# Patient Record
Sex: MALE | Race: WHITE
[De-identification: names, ages, dates, MRNs, and addresses within clinical notes are randomized per-mention and may not be internally consistent; named-entity substitution may affect disease eponyms.]

---

## 2021-04-27 PROBLEM — Z00.00 ENCOUNTER FOR PREVENTIVE HEALTH EXAMINATION: Status: ACTIVE | Noted: 2021-04-27

## 2021-05-10 ENCOUNTER — NON-APPOINTMENT (OUTPATIENT)
Age: 81
End: 2021-05-10

## 2021-05-10 ENCOUNTER — APPOINTMENT (OUTPATIENT)
Dept: CARDIOLOGY | Facility: CLINIC | Age: 81
End: 2021-05-10
Payer: MEDICARE

## 2021-05-10 VITALS
BODY MASS INDEX: 29.8 KG/M2 | WEIGHT: 220 LBS | SYSTOLIC BLOOD PRESSURE: 138 MMHG | HEIGHT: 72 IN | DIASTOLIC BLOOD PRESSURE: 70 MMHG | OXYGEN SATURATION: 100 % | HEART RATE: 76 BPM

## 2021-05-10 DIAGNOSIS — Z78.9 OTHER SPECIFIED HEALTH STATUS: ICD-10-CM

## 2021-05-10 DIAGNOSIS — Z87.891 PERSONAL HISTORY OF NICOTINE DEPENDENCE: ICD-10-CM

## 2021-05-10 DIAGNOSIS — R47.81 SLURRED SPEECH: ICD-10-CM

## 2021-05-10 DIAGNOSIS — Z83.3 FAMILY HISTORY OF DIABETES MELLITUS: ICD-10-CM

## 2021-05-10 PROCEDURE — 99204 OFFICE O/P NEW MOD 45 MIN: CPT

## 2021-05-10 PROCEDURE — 93000 ELECTROCARDIOGRAM COMPLETE: CPT

## 2021-05-10 RX ORDER — ATORVASTATIN CALCIUM 20 MG/1
20 TABLET, FILM COATED ORAL
Qty: 30 | Refills: 0 | Status: ACTIVE | COMMUNITY

## 2021-06-03 NOTE — DISCUSSION/SUMMARY
[FreeTextEntry1] : AF- cont Eliquis.  No bleeding issues\par \par HTN- controlled\par \par HLD- stable\par \par \par \par Followup in 3 mths

## 2021-06-03 NOTE — HISTORY OF PRESENT ILLNESS
[FreeTextEntry1] : 80 yea giovanid male with HTN, AF on Eliquis and rate controlled, and HLD presenting to change cardiologist.  Pt denies any sx of CP, SOB or h/A

## 2021-08-09 ENCOUNTER — APPOINTMENT (OUTPATIENT)
Dept: CARDIOLOGY | Facility: CLINIC | Age: 81
End: 2021-08-09
Payer: MEDICARE

## 2021-08-09 ENCOUNTER — NON-APPOINTMENT (OUTPATIENT)
Age: 81
End: 2021-08-09

## 2021-08-09 VITALS
HEIGHT: 72 IN | HEART RATE: 72 BPM | SYSTOLIC BLOOD PRESSURE: 150 MMHG | OXYGEN SATURATION: 98 % | DIASTOLIC BLOOD PRESSURE: 88 MMHG | WEIGHT: 220 LBS | BODY MASS INDEX: 29.8 KG/M2

## 2021-08-09 PROCEDURE — 93000 ELECTROCARDIOGRAM COMPLETE: CPT

## 2021-08-09 PROCEDURE — 99214 OFFICE O/P EST MOD 30 MIN: CPT

## 2021-08-22 NOTE — HISTORY OF PRESENT ILLNESS
[FreeTextEntry1] : 80 year old male with HTN, AF on Eliquis and rate controlled, and HLD presenting to change cardiologist.  Pt denies any sx of CP, SOB or h/A

## 2021-08-22 NOTE — DISCUSSION/SUMMARY
[FreeTextEntry1] : AF- cont Eliquis.  No bleeding issues\par \par HTN- controlled\par \par HLD- stable\par \par Followup in 3 mths\par \par Time spent reviewing history, med rec, exam, pt discussion and note writing

## 2021-10-08 ENCOUNTER — APPOINTMENT (OUTPATIENT)
Dept: NEUROLOGY | Facility: CLINIC | Age: 81
End: 2021-10-08

## 2021-11-12 ENCOUNTER — APPOINTMENT (OUTPATIENT)
Dept: CARDIOLOGY | Facility: CLINIC | Age: 81
End: 2021-11-12
Payer: MEDICARE

## 2021-11-12 ENCOUNTER — NON-APPOINTMENT (OUTPATIENT)
Age: 81
End: 2021-11-12

## 2021-11-12 VITALS — DIASTOLIC BLOOD PRESSURE: 93 MMHG | SYSTOLIC BLOOD PRESSURE: 145 MMHG | OXYGEN SATURATION: 98 % | HEART RATE: 83 BPM

## 2021-11-12 PROCEDURE — 93000 ELECTROCARDIOGRAM COMPLETE: CPT

## 2021-11-12 PROCEDURE — 99214 OFFICE O/P EST MOD 30 MIN: CPT

## 2021-12-02 NOTE — HISTORY OF PRESENT ILLNESS
[FreeTextEntry1] : 81 year old male with HTN, AF on Eliquis and rate controlled, and HLD presenting to change cardiologist.  Pt denies any sx of CP, SOB or h/A

## 2022-01-21 ENCOUNTER — APPOINTMENT (OUTPATIENT)
Dept: CARDIOLOGY | Facility: CLINIC | Age: 82
End: 2022-01-21
Payer: MEDICARE

## 2022-01-21 VITALS
HEART RATE: 75 BPM | BODY MASS INDEX: 30.61 KG/M2 | DIASTOLIC BLOOD PRESSURE: 88 MMHG | HEIGHT: 72 IN | OXYGEN SATURATION: 100 % | WEIGHT: 226 LBS | SYSTOLIC BLOOD PRESSURE: 149 MMHG

## 2022-01-21 PROCEDURE — 99214 OFFICE O/P EST MOD 30 MIN: CPT

## 2022-01-21 PROCEDURE — 93000 ELECTROCARDIOGRAM COMPLETE: CPT

## 2022-01-29 NOTE — DISCUSSION/SUMMARY
[FreeTextEntry1] : AF- cont Eliquis.  No bleeding issues\par \par HTN- controlled\par \par HLD- stable\par \par Followup in 6 mths\par \par Time spent reviewing history, med rec, exam, pt discussion and note writing

## 2022-03-21 ENCOUNTER — APPOINTMENT (OUTPATIENT)
Dept: NEUROLOGY | Facility: CLINIC | Age: 82
End: 2022-03-21
Payer: MEDICARE

## 2022-03-21 VITALS
WEIGHT: 230 LBS | BODY MASS INDEX: 31.15 KG/M2 | DIASTOLIC BLOOD PRESSURE: 88 MMHG | HEART RATE: 77 BPM | HEIGHT: 72 IN | SYSTOLIC BLOOD PRESSURE: 128 MMHG

## 2022-03-21 DIAGNOSIS — R27.0 ATAXIA, UNSPECIFIED: ICD-10-CM

## 2022-03-21 DIAGNOSIS — R44.1 VISUAL HALLUCINATIONS: ICD-10-CM

## 2022-03-21 PROCEDURE — 99204 OFFICE O/P NEW MOD 45 MIN: CPT

## 2022-03-21 NOTE — ASSESSMENT
[FreeTextEntry1] : Mr. Moses is an 81-year-old with subacute progressive motor, balance and cognitive difficulties.  Although possibly on the basis of cerebrovascular disease, I suspect a neurodegenerative disorder. 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  I cannot exclude dementia with Lewy bodies.\par \par I suggested that he proceed with an FDG PET scan of the brain.  Further management will depend upon that result and his clinical course.

## 2022-03-21 NOTE — REVIEW OF SYSTEMS
[Memory Lapses or Loss] : memory loss [Decr. Concentrating Ability] : decreased concentrating ability [Difficulty Walking] : difficulty walking [Negative] : Heme/Lymph

## 2022-03-21 NOTE — PHYSICAL EXAM
[FreeTextEntry1] : Constitutional:  Patient was well-developed, well-nourished and in no acute distress. \par \par Head:  Normocephalic, atraumatic. Tympanic membranes were clear. \par \par Neck:  Supple with full range of motion. \par \par Cardiovascular:  Cardiac rhythm was irregular without murmur. There were no carotid bruits. Peripheral pulses were full and symmetric. \par \par Respiratory:  Lungs were clear. \par \par Abdomen:  Soft and nontender. \par \par Spine:  Nontender. \par \par Skin:  There were no rashes. \par \par NEUROLOGICAL EXAMINATION:\par \par Mental Status: Patient was alert and oriented. Speech was fluent. There was no dysarthria.  He scored 27 out of 30 on MMSE making 3 errors in recall.  Handwriting was not micrographic.  He was hallucinating.\par \par Cranial Nerves: \par \par II: He could finger count bilaterally.  Pupils were surgical but reactive. Visual fields were full. Funduscopic examination was normal. \par \par III, IV, VI:  Eye movements were full but saccadic without nystagmus. \par \par V: Facial sensation was intact. \par \par VII: Facial strength was normal.  There was minimal if any hypomimia.\par \par VIII: Hearing was diminished bilaterally. \par \par IX, X: Palatal movement was normal. Phonation was normal. \par \par XI: Sternocleidomastoids and trapezii were normal. \par \par XII: Tongue was midline and movements normal. There was no lingual atrophy or fasciculations. \par \par Motor Examination: Muscle bulk, tone and strength were normal except for limited shoulder abduction, left worse than right..  There was no tremor or rigidity.\par \par Sensory Examination: Pinprick, vibration and joint position sense were intact. \par \par Reflexes: DTRs were 2 at the biceps and knees and absent elsewhere.\par \par Plantar Responses: Plantar responses were flexor. \par \par Coordination/Cerebellar Function: There was no dysmetria on finger to nose testing.  There was mild heel-to-shin dysmetria.\par \par Gait/Stance: Posture was mildly stooped.  He had a wide-based unsteady gait.  He was very unsteady on Romberg testing and could not tandem.\par

## 2022-03-21 NOTE — CONSULT LETTER
[Dear  ___] : Dear  [unfilled], [Consult Letter:] : I had the pleasure of evaluating your patient, [unfilled]. [Please see my note below.] : Please see my note below. [Consult Closing:] : Thank you very much for allowing me to participate in the care of this patient.  If you have any questions, please do not hesitate to contact me. [Sincerely,] : Sincerely, [DrChandan  ___] : Dr. LAO [FreeTextEntry3] : Abhilash Bernard MD\par

## 2022-04-06 ENCOUNTER — RESULT REVIEW (OUTPATIENT)
Age: 82
End: 2022-04-06

## 2022-04-07 ENCOUNTER — APPOINTMENT (OUTPATIENT)
Dept: NUCLEAR MEDICINE | Facility: IMAGING CENTER | Age: 82
End: 2022-04-07

## 2022-04-07 ENCOUNTER — OUTPATIENT (OUTPATIENT)
Dept: OUTPATIENT SERVICES | Facility: HOSPITAL | Age: 82
LOS: 1 days | End: 2022-04-07
Payer: MEDICARE

## 2022-04-07 DIAGNOSIS — G31.84 MILD COGNITIVE IMPAIRMENT OF UNCERTAIN OR UNKNOWN ETIOLOGY: ICD-10-CM

## 2022-04-07 PROCEDURE — XXXXX: CPT

## 2022-04-07 PROCEDURE — 78608 BRAIN IMAGING (PET): CPT | Mod: 26,ME

## 2022-04-07 PROCEDURE — G1004: CPT

## 2022-04-08 PROCEDURE — G1004: CPT

## 2022-04-08 PROCEDURE — 78999 UNLISTED MISC PX DX NUC MED: CPT

## 2022-04-08 PROCEDURE — 78608 BRAIN IMAGING (PET): CPT | Mod: ME

## 2022-04-08 PROCEDURE — A9552: CPT

## 2022-04-11 ENCOUNTER — NON-APPOINTMENT (OUTPATIENT)
Age: 82
End: 2022-04-11

## 2022-04-14 ENCOUNTER — TRANSCRIPTION ENCOUNTER (OUTPATIENT)
Age: 82
End: 2022-04-14

## 2022-04-18 ENCOUNTER — TRANSCRIPTION ENCOUNTER (OUTPATIENT)
Age: 82
End: 2022-04-18

## 2022-06-13 ENCOUNTER — TRANSCRIPTION ENCOUNTER (OUTPATIENT)
Age: 82
End: 2022-06-13

## 2022-06-16 ENCOUNTER — NON-APPOINTMENT (OUTPATIENT)
Age: 82
End: 2022-06-16

## 2022-06-16 ENCOUNTER — TRANSCRIPTION ENCOUNTER (OUTPATIENT)
Age: 82
End: 2022-06-16

## 2022-07-05 ENCOUNTER — RX RENEWAL (OUTPATIENT)
Age: 82
End: 2022-07-05

## 2022-07-18 ENCOUNTER — TRANSCRIPTION ENCOUNTER (OUTPATIENT)
Age: 82
End: 2022-07-18

## 2022-07-22 ENCOUNTER — APPOINTMENT (OUTPATIENT)
Dept: CARDIOLOGY | Facility: CLINIC | Age: 82
End: 2022-07-22

## 2022-07-28 ENCOUNTER — APPOINTMENT (OUTPATIENT)
Dept: NEUROLOGY | Facility: CLINIC | Age: 82
End: 2022-07-28

## 2022-08-12 ENCOUNTER — APPOINTMENT (OUTPATIENT)
Dept: CARDIOLOGY | Facility: CLINIC | Age: 82
End: 2022-08-12

## 2022-08-22 ENCOUNTER — APPOINTMENT (OUTPATIENT)
Dept: NEUROLOGY | Facility: CLINIC | Age: 82
End: 2022-08-22

## 2022-08-22 VITALS
SYSTOLIC BLOOD PRESSURE: 124 MMHG | WEIGHT: 220 LBS | HEART RATE: 80 BPM | BODY MASS INDEX: 29.8 KG/M2 | HEIGHT: 72 IN | DIASTOLIC BLOOD PRESSURE: 79 MMHG

## 2022-08-22 PROCEDURE — 99214 OFFICE O/P EST MOD 30 MIN: CPT

## 2022-08-22 NOTE — ASSESSMENT
[FreeTextEntry1] : Mr. Moses is an 81-year-old with subacute progressive motor, balance and cognitive difficulties.  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  His response to low-dose levodopa has been minimal if any.  He is experiencing side effects and Sinemet is being tapered.  He is scheduled to see Dr. Ramirez for a confirmatory second opinion in October.  I will provide Mr. Moses with a prescription for physical therapy.  I also completed an application for a handicap parking permit.  Further management will depend upon his clinical course.  He will be reevaluated in 6 months.  I suggested that he take all precautions to avoid falling.

## 2022-08-22 NOTE — HISTORY OF PRESENT ILLNESS
[FreeTextEntry1] : Mr. ROMAN WILSON returned to the office having been initially evaluated on March 21, 2020.  He is a 81 year right-handed patient who began experiencing difficulties with gait and balance in 2019.  He would occasionally experience slurred speech.  These episodes were fleeting.  In January 2021, he was hospitalized at [facility].  MRI of the brain revealed foci of T2 and flair hyperintensity in the subcortical and periventricular cerebral white matter suggesting mild chronic ischemic small vessel disease.  There were remote lacunar infarcts in the gangliocapsular regions and right frontal corona radiata.  There was a tiny focus of susceptibility in the right parietal region suggesting chronic microhemorrhage.\par \par He was subsequently evaluated by Dr. Katherine Ann.  MRI of the cervical spine did not reveal evidence of myelopathy.  Serologies were unremarkable.  A neuropsychological evaluation revealed mild cognitive impairment.\par \par According to Mrs. Wilson, he had short-term memory loss or repetitiveness for a year.  His speech was occasionally thick.  He was hard of hearing.  He complained of distortion of images.  He described the chairs in the dining room leaning backwards.  He had urinary frequency.  He was undergoing physical therapy.\par \par His subacute progressive motor, balance and cognitive difficulties with possibly on the basis of cerebrovascular disease but I suspected a neurodegenerative disorder.  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  This did not significant improve his gait but caused severe diarrhea.  Sinemet is being tapered.\par \par According to Mrs. Wilson, his imbalance is worsening.  He feels that he is dragging his left leg and drools.\par \par Past surgical history is notable for cataract extractions, bilateral knee replacements and radiation for cancer of the prostate.  Medical problems include hypertension hyperlipidemia, atrial fibrillation, irritable bowel syndrome and prostate cancer.  He has no allergies.  Medications include apixaban 5 mg twice a day, metoprolol 50 mg twice a day, amlodipine 10 mg daily and atorvastatin 20 mg daily.  He is taking Sinemet 25/100 1 tablet twice a day.\par \par He is  and is a retired publishing executive.  He is a former smoker and drinks socially.  Family history is notable for a brother who suffers from Alzheimer's disease.  His mother suffered from diabetes and his father from gastrointestinal problems.\par \par

## 2022-09-02 ENCOUNTER — NON-APPOINTMENT (OUTPATIENT)
Age: 82
End: 2022-09-02

## 2022-09-02 ENCOUNTER — APPOINTMENT (OUTPATIENT)
Dept: CARDIOLOGY | Facility: CLINIC | Age: 82
End: 2022-09-02

## 2022-09-02 VITALS
OXYGEN SATURATION: 98 % | WEIGHT: 220 LBS | BODY MASS INDEX: 29.8 KG/M2 | HEART RATE: 70 BPM | HEIGHT: 72 IN | DIASTOLIC BLOOD PRESSURE: 84 MMHG | SYSTOLIC BLOOD PRESSURE: 131 MMHG

## 2022-09-02 DIAGNOSIS — E78.5 HYPERLIPIDEMIA, UNSPECIFIED: ICD-10-CM

## 2022-09-02 DIAGNOSIS — I10 ESSENTIAL (PRIMARY) HYPERTENSION: ICD-10-CM

## 2022-09-02 PROCEDURE — 99214 OFFICE O/P EST MOD 30 MIN: CPT

## 2022-09-02 PROCEDURE — 93000 ELECTROCARDIOGRAM COMPLETE: CPT

## 2022-09-02 RX ORDER — CARBIDOPA AND LEVODOPA 25; 100 MG/1; MG/1
25-100 TABLET ORAL 3 TIMES DAILY
Qty: 540 | Refills: 3 | Status: DISCONTINUED | COMMUNITY
Start: 2022-04-13 | End: 2022-09-02

## 2022-10-03 PROBLEM — I10 HYPERTENSION: Status: ACTIVE | Noted: 2021-05-10

## 2022-10-03 PROBLEM — E78.5 HYPERLIPIDEMIA: Status: ACTIVE | Noted: 2021-05-10

## 2022-10-03 NOTE — HISTORY OF PRESENT ILLNESS
[FreeTextEntry1] : 82 year old male with HTN, AF on Eliquis and rate controlled, and HLD presenting to change cardiologist.  Pt denies any sx of CP, SOB or h/A

## 2022-10-03 NOTE — DISCUSSION/SUMMARY
[FreeTextEntry1] : AF- cont Eliquis.  No bleeding issues\par \par HTN- controlled\par \par HLD- stable\par \par Followup in 6 mths\par \par Time spent reviewing history, med rec, exam, pt discussion and note writing [EKG obtained to assist in diagnosis and management of assessed problem(s)] : EKG obtained to assist in diagnosis and management of assessed problem(s)

## 2022-10-07 ENCOUNTER — APPOINTMENT (OUTPATIENT)
Dept: NEUROLOGY | Facility: CLINIC | Age: 82
End: 2022-10-07

## 2022-10-07 VITALS
DIASTOLIC BLOOD PRESSURE: 84 MMHG | HEIGHT: 72 IN | SYSTOLIC BLOOD PRESSURE: 138 MMHG | BODY MASS INDEX: 29.8 KG/M2 | WEIGHT: 220 LBS | HEART RATE: 62 BPM

## 2022-10-07 DIAGNOSIS — I48.91 UNSPECIFIED ATRIAL FIBRILLATION: ICD-10-CM

## 2022-10-07 DIAGNOSIS — G31.84 MILD COGNITIVE IMPAIRMENT, SO STATED: ICD-10-CM

## 2022-10-07 PROCEDURE — 99215 OFFICE O/P EST HI 40 MIN: CPT

## 2022-10-07 NOTE — DISCUSSION/SUMMARY
[FreeTextEntry1] : In summary, the patient is an 82-year-old right-handed man with difficulty walking and slurred speech since about 2019.  He also has some mild cognitive issues.  The examination was significant for square wave jerks, a wide-based gait, and may be minimal left-sided bradykinesia but no other movement abnormalities.\par \par Overall I am not certain at this point of his diagnosis.  While there may be some progression, it has not been significant.  While the FDG PET scan did suggest the possibility of progressive supranuclear palsy, outside of the square jerks he did not have any eye movement abnormalities, and he had no axial rigidity.  Reviewing the FDG PET images I am also not certain if he had hypometabolism of the basal ganglia, and I will check with the radiologist on that.  Levodopa did not help him, but again, he does not have any significant parkinsonism.  \par As a next step, I did recommend repeating the MRI of the brain since it has been a few years.  I also recommended an after by PET scan to see if there is any dopaminergic problem.  Even though the time course would be unusual I did also recommend a paraneoplastic panel be checked, as well as B12 and vitamin D levels.  He is already getting physical therapy, which I recommended he continue.\par \par I spent approximately 65 minutes with the patient and his wife, examining and discussing the above findings impression.  Follow-up will be in 5 to 6 months, sooner if new problems arise, and they will let me know when the imaging has been done.

## 2022-10-07 NOTE — HISTORY OF PRESENT ILLNESS
[FreeTextEntry1] : The patient is a 82-year-old right-handed man who comes referred for gait issues since 2019.  Around that time he developed slurred speech and started falling.  At times this was backwards other times his legs just seem to give in.  In January 2021 he was hospitalized in [place].  An MRI of the brain at that time showed mild chronic ischemic small vessel disease with remote lacunar infarcts in the gangliocapsular region and right frontal corona radiata.  There also was a tiny focus assessability in the right parietal region suggesting a chronic microhemorrhage.\par He was subsequently evaluated by Dr. Katherine Ann.  An MRI of the cervical spine did not show any myelopathy.  Serologies were unremarkable.  A neuropsychological radiation revealed mild cognitive impairment.\par \par Besides the slurred speech he has no changes in his voice.  There are no changes in his facial expression.  He has no drooling or dysphagia.  He has no trouble turning over in bed.  His handwriting has become less legible but not change in size.  He is independent in ADLs.  His walking is difficult though he has no freezing of gait.  He does fall.  There are no tremors.\par \par His memory is " not bad".  He has mild depression but no anxiety.  He has no hallucinations except he sometimes feels that the backs of the kitchen chairs appear to be moving.  He has decreased hearing.  He has no history of acting out his dreams.  He has no constipation or urinary issues except those caused by prostate cancer radiation treatment.  He has no orthostasis.  There is no family history of parkinsonian disorders.\par \par He was evaluated by my colleague Dr. Abhilash Bernard.  An FDG PET scan was read as potentially consistent with progressive supranuclear palsy.  He tried levodopa up to 2 pills 3 times a day which did not make any difference but caused diarrhea.\par \par He is  and is a retired publishing executive.  He is a former smoker and drinks socially.  Family history is notable for a brother who suffers from Alzheimer's disease.  His mother suffered from diabetes and his father from gastrointestinal problems.\par \par

## 2022-10-07 NOTE — PHYSICAL EXAM
[Person] : oriented to person [Place] : oriented to place [Time] : oriented to time [Registration Intact] : recent registration memory intact [Naming Objects] : no difficulty naming common objects [Repeating Phrases] : no difficulty repeating a phrase [Writing A Sentence] : no difficulty writing a sentence [Fluency] : fluency intact [Comprehension] : comprehension intact [Reading] : reading intact [Cranial Nerves Optic (II)] : visual acuity intact bilaterally,  visual fields full to confrontation, pupils equal round and reactive to light [Cranial Nerves Oculomotor (III)] : extraocular motion intact [Cranial Nerves Trigeminal (V)] : facial sensation intact symmetrically [Cranial Nerves Facial (VII)] : face symmetrical [Cranial Nerves Vestibulocochlear (VIII)] : hearing was intact bilaterally [Cranial Nerves Glossopharyngeal (IX)] : tongue and palate midline [Cranial Nerves Accessory (XI - Cranial And Spinal)] : head turning and shoulder shrug symmetric [Cranial Nerves Hypoglossal (XII)] : there was no tongue deviation with protrusion [Motor Handedness Right-Handed] : the patient is right hand dominant [Sensation Tactile Decrease] : light touch was intact [Sensation Vibration Decrease] : vibration was intact [Proprioception] : proprioception was intact [1+] : Ankle jerk left 1+ [Short Term Intact] : short term memory impaired [Dysdiadochokinesia Bilaterally] : not present [Coordination - Dysmetria Impaired Finger-to-Nose Bilateral] : not present [Coordination - Dysmetria Impaired Heel-to-Shin Bilateral] : not present [Plantar Reflex Right Only] : normal on the right [Plantar Reflex Left Only] : normal on the left [FreeTextEntry4] : 1/3 delayed recall.  [FreeTextEntry1] : Facial expression and volume of speech were normal except the speech was mildly dysarthric.  Extraocular movements were intact with broken pursuit and intermittent square of jerks.\par Shoulder shrug was symmetric.  Tone was normal throughout, including the neck.  He had perhaps minimal bradykinesia on the left side, and finger-nose testing was not quite as accurate on the left hand, though not clearly ataxic either.\par He was able to get up from the chair without using his arms.  Gait was wide-based with slightly shortened stride.  Turns was multistep.  Romberg was negative, as was pull test.\par He had no rest or action tremors.\par

## 2022-10-18 LAB
FOLATE SERPL-MCNC: 12.6 NG/ML
VIT B12 SERPL-MCNC: 212 PG/ML

## 2022-10-24 LAB
A-TOCOPHEROL VIT E SERPL-MCNC: 10.3 MG/L
BETA+GAMMA TOCOPHEROL SERPL-MCNC: 1.6 MG/L
PARANEOPLASTIC AB PNL SER: NORMAL

## 2022-10-26 LAB
AMPA-R ABCBA: NEGATIVE
AMPHIPHYSIN IGG TITR SER IF: NEGATIVE TITER
CASPR2-IGG CBA, S: NEGATIVE
CV2 IGG TITR SER: NEGATIVE TITER
GABA-B ABCBA: NEGATIVE
GAD65 AB SER-MCNC: 0 NMOL/L
GLIAL NUC TYPE 1 AB TITR SER: NEGATIVE TITER
HU1 AB TITR SER: NEGATIVE TITER
HU2 AB TITR SER IF: NEGATIVE TITER
HU3 AB TITR SER: NEGATIVE TITER
IGLON5 IFA, S: NEGATIVE
IMMUNOLOGIST REVIEW: NORMAL
LGI1-IGG CBA, S: NEGATIVE
NIF IFA, S: NEGATIVE
NMDA-R ABCBA: NEGATIVE
PCA-1 AB TITR SER: NEGATIVE TITER
PCA-2 AB TITR SER: NEGATIVE TITER
PCA-TR AB TITR SER: NEGATIVE TITER
REFLEX ADDED: NORMAL

## 2022-11-03 ENCOUNTER — APPOINTMENT (OUTPATIENT)
Dept: NUCLEAR MEDICINE | Facility: IMAGING CENTER | Age: 82
End: 2022-11-03

## 2022-11-03 ENCOUNTER — OUTPATIENT (OUTPATIENT)
Dept: OUTPATIENT SERVICES | Facility: HOSPITAL | Age: 82
LOS: 1 days | End: 2022-11-03
Payer: MEDICARE

## 2022-11-03 ENCOUNTER — APPOINTMENT (OUTPATIENT)
Dept: MRI IMAGING | Facility: IMAGING CENTER | Age: 82
End: 2022-11-03

## 2022-11-03 DIAGNOSIS — G23.1: ICD-10-CM

## 2022-11-03 PROCEDURE — 70551 MRI BRAIN STEM W/O DYE: CPT

## 2022-11-03 PROCEDURE — 76377 3D RENDER W/INTRP POSTPROCES: CPT

## 2022-11-03 PROCEDURE — 76377 3D RENDER W/INTRP POSTPROCES: CPT | Mod: 26

## 2022-11-03 PROCEDURE — 70551 MRI BRAIN STEM W/O DYE: CPT | Mod: 26,MH

## 2022-11-07 ENCOUNTER — NON-APPOINTMENT (OUTPATIENT)
Age: 82
End: 2022-11-07

## 2022-11-14 ENCOUNTER — OUTPATIENT (OUTPATIENT)
Dept: OUTPATIENT SERVICES | Facility: HOSPITAL | Age: 82
LOS: 1 days | End: 2022-11-14
Payer: MEDICARE

## 2022-11-14 ENCOUNTER — RESULT REVIEW (OUTPATIENT)
Age: 82
End: 2022-11-14

## 2022-11-14 ENCOUNTER — APPOINTMENT (OUTPATIENT)
Dept: NUCLEAR MEDICINE | Facility: IMAGING CENTER | Age: 82
End: 2022-11-14

## 2022-11-14 DIAGNOSIS — G23.1: ICD-10-CM

## 2022-11-14 PROCEDURE — 78814 PET IMAGE W/CT LMTD: CPT | Mod: 26,MH

## 2022-11-14 PROCEDURE — C1739: CPT

## 2022-11-14 PROCEDURE — 78999 UNLISTED MISC PX DX NUC MED: CPT | Mod: 26,MH

## 2022-11-14 PROCEDURE — 78814 PET IMAGE W/CT LMTD: CPT

## 2022-11-14 PROCEDURE — A9552: CPT

## 2022-11-14 PROCEDURE — 78999 UNLISTED MISC PX DX NUC MED: CPT

## 2022-11-21 ENCOUNTER — RX RENEWAL (OUTPATIENT)
Age: 82
End: 2022-11-21

## 2022-11-29 ENCOUNTER — TRANSCRIPTION ENCOUNTER (OUTPATIENT)
Age: 82
End: 2022-11-29

## 2023-03-06 ENCOUNTER — APPOINTMENT (OUTPATIENT)
Dept: CARDIOLOGY | Facility: CLINIC | Age: 83
End: 2023-03-06

## 2023-04-10 ENCOUNTER — TRANSCRIPTION ENCOUNTER (OUTPATIENT)
Age: 83
End: 2023-04-10

## 2023-04-10 ENCOUNTER — NON-APPOINTMENT (OUTPATIENT)
Age: 83
End: 2023-04-10

## 2023-04-11 ENCOUNTER — TRANSCRIPTION ENCOUNTER (OUTPATIENT)
Age: 83
End: 2023-04-11

## 2023-04-24 ENCOUNTER — APPOINTMENT (OUTPATIENT)
Dept: NEUROLOGY | Facility: CLINIC | Age: 83
End: 2023-04-24

## 2023-04-26 ENCOUNTER — APPOINTMENT (OUTPATIENT)
Dept: NEUROLOGY | Facility: CLINIC | Age: 83
End: 2023-04-26

## 2023-06-27 ENCOUNTER — RX RENEWAL (OUTPATIENT)
Age: 83
End: 2023-06-27

## 2023-06-28 RX ORDER — METOPROLOL SUCCINATE 50 MG/1
50 TABLET, EXTENDED RELEASE ORAL
Qty: 180 | Refills: 0 | Status: ACTIVE | COMMUNITY
Start: 2022-07-05 | End: 1900-01-01

## 2023-10-16 RX ORDER — APIXABAN 5 MG/1
5 TABLET, FILM COATED ORAL
Qty: 120 | Refills: 0 | Status: ACTIVE | COMMUNITY
Start: 1900-01-01 | End: 1900-01-01

## 2023-11-13 ENCOUNTER — RX RENEWAL (OUTPATIENT)
Age: 83
End: 2023-11-13

## 2023-11-20 ENCOUNTER — APPOINTMENT (OUTPATIENT)
Dept: CARDIOLOGY | Facility: CLINIC | Age: 83
End: 2023-11-20

## 2024-02-12 ENCOUNTER — RX RENEWAL (OUTPATIENT)
Age: 84
End: 2024-02-12

## 2024-04-19 ENCOUNTER — APPOINTMENT (OUTPATIENT)
Dept: NEUROLOGY | Facility: CLINIC | Age: 84
End: 2024-04-19
Payer: MEDICARE

## 2024-04-19 DIAGNOSIS — G23.1: ICD-10-CM

## 2024-04-19 PROCEDURE — G2211 COMPLEX E/M VISIT ADD ON: CPT

## 2024-04-19 PROCEDURE — 99214 OFFICE O/P EST MOD 30 MIN: CPT

## 2024-05-11 ENCOUNTER — RX RENEWAL (OUTPATIENT)
Age: 84
End: 2024-05-11

## 2024-05-11 RX ORDER — AMLODIPINE BESYLATE 10 MG/1
10 TABLET ORAL DAILY
Qty: 90 | Refills: 0 | Status: ACTIVE | COMMUNITY
Start: 2022-11-21 | End: 1900-01-01

## 2024-05-29 ENCOUNTER — TRANSCRIPTION ENCOUNTER (OUTPATIENT)
Age: 84
End: 2024-05-29

## 2024-06-04 ENCOUNTER — TRANSCRIPTION ENCOUNTER (OUTPATIENT)
Age: 84
End: 2024-06-04

## 2024-07-17 ENCOUNTER — TRANSCRIPTION ENCOUNTER (OUTPATIENT)
Age: 84
End: 2024-07-17

## 2024-07-19 ENCOUNTER — TRANSCRIPTION ENCOUNTER (OUTPATIENT)
Age: 84
End: 2024-07-19

## 2024-11-22 ENCOUNTER — APPOINTMENT (OUTPATIENT)
Dept: NEUROLOGY | Facility: CLINIC | Age: 84
End: 2024-11-22
Payer: MEDICARE

## 2024-11-22 VITALS
SYSTOLIC BLOOD PRESSURE: 148 MMHG | DIASTOLIC BLOOD PRESSURE: 88 MMHG | HEIGHT: 72 IN | HEART RATE: 76 BPM | WEIGHT: 220 LBS | BODY MASS INDEX: 29.8 KG/M2

## 2024-11-22 DIAGNOSIS — G31.84 MILD COGNITIVE IMPAIRMENT, SO STATED: ICD-10-CM

## 2024-11-22 DIAGNOSIS — G23.1: ICD-10-CM

## 2024-11-22 PROCEDURE — G2211 COMPLEX E/M VISIT ADD ON: CPT

## 2024-11-22 PROCEDURE — 99214 OFFICE O/P EST MOD 30 MIN: CPT

## 2025-01-16 ENCOUNTER — APPOINTMENT (OUTPATIENT)
Dept: ORTHOPEDIC SURGERY | Facility: CLINIC | Age: 85
End: 2025-01-16
Payer: MEDICARE

## 2025-01-16 VITALS — WEIGHT: 225 LBS | BODY MASS INDEX: 27.98 KG/M2 | HEIGHT: 75 IN

## 2025-01-16 DIAGNOSIS — Z98.890 OTHER SPECIFIED POSTPROCEDURAL STATES: ICD-10-CM

## 2025-01-16 DIAGNOSIS — Z87.81 OTHER SPECIFIED POSTPROCEDURAL STATES: ICD-10-CM

## 2025-01-16 DIAGNOSIS — M19.071 PRIMARY OSTEOARTHRITIS, RIGHT ANKLE AND FOOT: ICD-10-CM

## 2025-01-16 DIAGNOSIS — S82.891S OTHER FRACTURE OF RIGHT LOWER LEG, SEQUELA: ICD-10-CM

## 2025-01-16 DIAGNOSIS — M21.41 FLAT FOOT [PES PLANUS] (ACQUIRED), RIGHT FOOT: ICD-10-CM

## 2025-01-16 PROCEDURE — 99203 OFFICE O/P NEW LOW 30 MIN: CPT

## 2025-01-16 PROCEDURE — 73610 X-RAY EXAM OF ANKLE: CPT | Mod: RT

## 2025-06-20 ENCOUNTER — APPOINTMENT (OUTPATIENT)
Dept: NEUROLOGY | Facility: CLINIC | Age: 85
End: 2025-06-20

## 2025-06-30 ENCOUNTER — TRANSCRIPTION ENCOUNTER (OUTPATIENT)
Age: 85
End: 2025-06-30

## 2025-07-02 ENCOUNTER — TRANSCRIPTION ENCOUNTER (OUTPATIENT)
Age: 85
End: 2025-07-02

## 2025-07-16 ENCOUNTER — APPOINTMENT (OUTPATIENT)
Dept: NEUROLOGY | Facility: CLINIC | Age: 85
End: 2025-07-16
Payer: MEDICARE

## 2025-07-16 PROCEDURE — G2211 COMPLEX E/M VISIT ADD ON: CPT | Mod: 2W

## 2025-07-16 PROCEDURE — 99214 OFFICE O/P EST MOD 30 MIN: CPT | Mod: 2W
